# Patient Record
Sex: MALE | Race: WHITE
[De-identification: names, ages, dates, MRNs, and addresses within clinical notes are randomized per-mention and may not be internally consistent; named-entity substitution may affect disease eponyms.]

---
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
## 2018-01-28 ENCOUNTER — HOSPITAL ENCOUNTER (EMERGENCY)
Dept: HOSPITAL 47 - EC | Age: 18
Discharge: HOME | End: 2018-01-28
Payer: COMMERCIAL

## 2018-01-28 VITALS — SYSTOLIC BLOOD PRESSURE: 134 MMHG | DIASTOLIC BLOOD PRESSURE: 74 MMHG

## 2018-01-28 VITALS — RESPIRATION RATE: 18 BRPM | TEMPERATURE: 98.7 F | HEART RATE: 98 BPM
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
## 2018-01-28 DIAGNOSIS — S90.511A: ICD-10-CM

## 2018-01-28 DIAGNOSIS — F17.200: ICD-10-CM

## 2018-01-28 DIAGNOSIS — R00.0: ICD-10-CM

## 2018-01-28 DIAGNOSIS — R40.2412: ICD-10-CM
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
## 2018-01-28 DIAGNOSIS — S80.211A: ICD-10-CM

## 2018-01-28 DIAGNOSIS — S80.212A: Primary | ICD-10-CM
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
## 2018-01-28 LAB
ALBUMIN SERPL-MCNC: 4.8 G/DL (ref 3.5–5)
ALP SERPL-CCNC: 113 U/L (ref 38–126)
ALT SERPL-CCNC: 40 U/L (ref 21–72)
AMYLASE SERPL-CCNC: 83 U/L (ref 30–110)
ANION GAP SERPL CALC-SCNC: 14 MMOL/L
APTT BLD: 22 SEC (ref 22–30)
AST SERPL-CCNC: 29 U/L (ref 17–59)
BASOPHILS # BLD AUTO: 0 K/UL (ref 0–0.2)
BASOPHILS NFR BLD AUTO: 0 %
BUN SERPL-SCNC: 11 MG/DL (ref 9–20)
CALCIUM SPEC-MCNC: 10.4 MG/DL (ref 8.4–10.2)
CHLORIDE SERPL-SCNC: 108 MMOL/L (ref 98–107)
CK SERPL-CCNC: 349 U/L (ref 55–170)
CO2 SERPL-SCNC: 26 MMOL/L (ref 22–30)
EOSINOPHIL # BLD AUTO: 0 K/UL (ref 0–0.7)
EOSINOPHIL NFR BLD AUTO: 0 %
ERYTHROCYTE [DISTWIDTH] IN BLOOD BY AUTOMATED COUNT: 5.48 M/UL (ref 4.3–5.9)
ERYTHROCYTE [DISTWIDTH] IN BLOOD: 13 % (ref 11.5–15.5)
GLUCOSE BLD-MCNC: 97 MG/DL (ref 75–99)
GLUCOSE SERPL-MCNC: 99 MG/DL (ref 74–99)
HCT VFR BLD AUTO: 48.3 % (ref 39–53)
HGB BLD-MCNC: 16.2 GM/DL (ref 13–17.5)
INR PPP: 1.1 (ref ?–1.2)
LIPASE SERPL-CCNC: 38 U/L (ref 23–300)
LYMPHOCYTES # SPEC AUTO: 1.6 K/UL (ref 1–4.8)
LYMPHOCYTES NFR SPEC AUTO: 18 %
MCH RBC QN AUTO: 29.5 PG (ref 25–35)
MCHC RBC AUTO-ENTMCNC: 33.5 G/DL (ref 31–37)
MCV RBC AUTO: 88.1 FL (ref 80–100)
MONOCYTES # BLD AUTO: 0.5 K/UL (ref 0–1)
MONOCYTES NFR BLD AUTO: 6 %
NEUTROPHILS # BLD AUTO: 7 K/UL (ref 1.3–7.7)
NEUTROPHILS NFR BLD AUTO: 75 %
PLATELET # BLD AUTO: 206 K/UL (ref 150–450)
POTASSIUM SERPL-SCNC: 3.9 MMOL/L (ref 3.5–5.1)
PROT SERPL-MCNC: 7.7 G/DL (ref 6.3–8.2)
PT BLD: 10.8 SEC (ref 9–12)
SODIUM SERPL-SCNC: 148 MMOL/L (ref 137–145)
TROPONIN I SERPL-MCNC: <0.012 NG/ML (ref 0–0.03)
WBC # BLD AUTO: 9.3 K/UL (ref 4–11)

## 2018-01-28 PROCEDURE — 80053 COMPREHEN METABOLIC PANEL: CPT

## 2018-01-28 PROCEDURE — 85610 PROTHROMBIN TIME: CPT

## 2018-01-28 PROCEDURE — 85730 THROMBOPLASTIN TIME PARTIAL: CPT

## 2018-01-28 PROCEDURE — 71045 X-RAY EXAM CHEST 1 VIEW: CPT

## 2018-01-28 PROCEDURE — 86850 RBC ANTIBODY SCREEN: CPT

## 2018-01-28 PROCEDURE — 85025 COMPLETE CBC W/AUTO DIFF WBC: CPT

## 2018-01-28 PROCEDURE — 72170 X-RAY EXAM OF PELVIS: CPT

## 2018-01-28 PROCEDURE — 82553 CREATINE MB FRACTION: CPT

## 2018-01-28 PROCEDURE — 99284 EMERGENCY DEPT VISIT MOD MDM: CPT

## 2018-01-28 PROCEDURE — 36415 COLL VENOUS BLD VENIPUNCTURE: CPT

## 2018-01-28 PROCEDURE — 82550 ASSAY OF CK (CPK): CPT

## 2018-01-28 PROCEDURE — 86900 BLOOD TYPING SEROLOGIC ABO: CPT

## 2018-01-28 PROCEDURE — 93005 ELECTROCARDIOGRAM TRACING: CPT

## 2018-01-28 PROCEDURE — 80320 DRUG SCREEN QUANTALCOHOLS: CPT

## 2018-01-28 PROCEDURE — 84484 ASSAY OF TROPONIN QUANT: CPT

## 2018-01-28 PROCEDURE — 83690 ASSAY OF LIPASE: CPT

## 2018-01-28 PROCEDURE — 86901 BLOOD TYPING SEROLOGIC RH(D): CPT

## 2018-01-28 PROCEDURE — 82150 ASSAY OF AMYLASE: CPT
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
## 2018-03-31 ENCOUNTER — HOSPITAL ENCOUNTER (EMERGENCY)
Dept: HOSPITAL 47 - EC | Age: 18
Discharge: HOME | End: 2018-03-31
Payer: COMMERCIAL

## 2018-03-31 VITALS — RESPIRATION RATE: 18 BRPM

## 2018-03-31 VITALS — HEART RATE: 56 BPM | SYSTOLIC BLOOD PRESSURE: 114 MMHG | DIASTOLIC BLOOD PRESSURE: 61 MMHG | TEMPERATURE: 97 F

## 2018-03-31 DIAGNOSIS — F17.200: ICD-10-CM

## 2018-03-31 DIAGNOSIS — K52.9: Primary | ICD-10-CM

## 2018-03-31 LAB
ALBUMIN SERPL-MCNC: 4.3 G/DL (ref 3.5–5)
ALP SERPL-CCNC: 97 U/L (ref 38–126)
ALT SERPL-CCNC: 31 U/L (ref 21–72)
AMYLASE SERPL-CCNC: 73 U/L (ref 30–110)
ANION GAP SERPL CALC-SCNC: 15 MMOL/L
AST SERPL-CCNC: 22 U/L (ref 17–59)
BASOPHILS # BLD AUTO: 0 K/UL (ref 0–0.2)
BASOPHILS NFR BLD AUTO: 0 %
BUN SERPL-SCNC: 16 MG/DL (ref 9–20)
CALCIUM SPEC-MCNC: 9.6 MG/DL (ref 8.4–10.2)
CHLORIDE SERPL-SCNC: 107 MMOL/L (ref 98–107)
CO2 SERPL-SCNC: 26 MMOL/L (ref 22–30)
EOSINOPHIL # BLD AUTO: 0.2 K/UL (ref 0–0.7)
EOSINOPHIL NFR BLD AUTO: 2 %
ERYTHROCYTE [DISTWIDTH] IN BLOOD BY AUTOMATED COUNT: 5.5 M/UL (ref 4.3–5.9)
ERYTHROCYTE [DISTWIDTH] IN BLOOD: 13.2 % (ref 11.5–15.5)
GLUCOSE SERPL-MCNC: 90 MG/DL (ref 74–99)
HCT VFR BLD AUTO: 46.8 % (ref 39–53)
HGB BLD-MCNC: 15.3 GM/DL (ref 13–17.5)
LIPASE SERPL-CCNC: 36 U/L (ref 23–300)
LYMPHOCYTES # SPEC AUTO: 1.7 K/UL (ref 1–4.8)
LYMPHOCYTES NFR SPEC AUTO: 19 %
MCH RBC QN AUTO: 27.8 PG (ref 25–35)
MCHC RBC AUTO-ENTMCNC: 32.7 G/DL (ref 31–37)
MCV RBC AUTO: 85.1 FL (ref 80–100)
MONOCYTES # BLD AUTO: 0.6 K/UL (ref 0–1)
MONOCYTES NFR BLD AUTO: 7 %
NEUTROPHILS # BLD AUTO: 6.5 K/UL (ref 1.3–7.7)
NEUTROPHILS NFR BLD AUTO: 71 %
PH UR: 5.5 [PH] (ref 5–8)
PLATELET # BLD AUTO: 181 K/UL (ref 150–450)
POTASSIUM SERPL-SCNC: 3.9 MMOL/L (ref 3.5–5.1)
PROT SERPL-MCNC: 7.1 G/DL (ref 6.3–8.2)
RBC UR QL: 2 /HPF (ref 0–5)
SODIUM SERPL-SCNC: 148 MMOL/L (ref 137–145)
SP GR UR: 1.02 (ref 1–1.03)
UROBILINOGEN UR QL STRIP: <2 MG/DL (ref ?–2)
WBC # BLD AUTO: 9.1 K/UL (ref 4–11)
WBC #/AREA URNS HPF: 27 /HPF (ref 0–5)

## 2018-03-31 PROCEDURE — 96361 HYDRATE IV INFUSION ADD-ON: CPT

## 2018-03-31 PROCEDURE — 74022 RADEX COMPL AQT ABD SERIES: CPT

## 2018-03-31 PROCEDURE — 82150 ASSAY OF AMYLASE: CPT

## 2018-03-31 PROCEDURE — 36415 COLL VENOUS BLD VENIPUNCTURE: CPT

## 2018-03-31 PROCEDURE — 96374 THER/PROPH/DIAG INJ IV PUSH: CPT

## 2018-03-31 PROCEDURE — 85025 COMPLETE CBC W/AUTO DIFF WBC: CPT

## 2018-03-31 PROCEDURE — 83690 ASSAY OF LIPASE: CPT

## 2018-03-31 PROCEDURE — 99284 EMERGENCY DEPT VISIT MOD MDM: CPT

## 2018-03-31 PROCEDURE — 96376 TX/PRO/DX INJ SAME DRUG ADON: CPT

## 2018-03-31 PROCEDURE — 74177 CT ABD & PELVIS W/CONTRAST: CPT

## 2018-03-31 PROCEDURE — 80053 COMPREHEN METABOLIC PANEL: CPT

## 2018-03-31 PROCEDURE — 86140 C-REACTIVE PROTEIN: CPT

## 2018-03-31 PROCEDURE — 81001 URINALYSIS AUTO W/SCOPE: CPT

## 2018-03-31 PROCEDURE — 96375 TX/PRO/DX INJ NEW DRUG ADDON: CPT
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
## 2018-03-31 NOTE — XR
EXAMINATION TYPE: XR abdomen acute w cxr , 4 VIEWS

 

DATE OF EXAM ORDERED: 3/31/2018

 

HISTORY: Pain.

 

COMPARISON: None.

 

FINDINGS:  The lungs are clear. Pleural space are clear. The heart is not enlarged.

 

Within the abdomen, the abdominal gas pattern is normal. There is no evidence of obstruction or free 
air. There is a phlebolith in the right hemipelvis.

 

The right femoral head is nonspherical.

 

IMPRESSION: 

1. NO ACUTE THORACIC OR ABDOMINAL ABNORMALITY.

2. PLEASE CORRELATE CLINICALLY FOR FEMOROACETABULAR IMPINGEMENT SYNDROME OF THE RIGHT HIP.

## 2018-03-31 NOTE — CT
EXAMINATION TYPE: CT abdomen pelvis w con

 

DATE OF EXAM: 3/31/2018

 

REFERENCE: Previous study dated 4/14/2014

 

HISTORY: Pain

HISTORY: Abd pain

 

REFERENCE: NONE

 

CT DLP: 418.3 mGy

Automated exposure control for dose reduction was used.

 

TECHNIQUE: Helical acquisition through the abdomen and pelvis was obtained following the oral ingesti
on of without Oral Contrast and following intravenous administration of 100 mL of Isovue 300. The anh
a was reformatted in axial, coronal and sagittal projections.

 

FINDINGS:  Visualized portions of the lungs are clear. There is no pleural or pericardial fluid. The 
heart is not enlarged.

 

Within the abdomen, the liver is mildly prominent measuring 19 cm. The spleen and gallbladder are nor
mal.

 

Both adrenal glands are normal.

 

Both kidneys demonstrate function and appear morphologically normal.

 

Limited views of the pancreas appear normal.

 

There is no significant retroperitoneal, iliac or inguinal adenopathy.

 

The bladder is not distended.

 

Limited views of the appendix appear normal.

 

There are some mildly prominent loops of small bowel in the right side of the pelvis. The wall appear
s slightly thickened. The remainder the small bowel is of normal caliber with various loops do appear
 thickened.

 

There is a scant amount of free fluid within the pelvis. No free air is seen.

 

IMPRESSION: 

1. THICKENED LOOPS OF SMALL BOWEL. PLEASE CORRELATE ENTERITIS.

2. HEPATOMEGALY.

## 2019-03-07 ENCOUNTER — HOSPITAL ENCOUNTER (EMERGENCY)
Dept: HOSPITAL 47 - EC | Age: 19
LOS: 1 days | Discharge: HOME | End: 2019-03-08
Payer: COMMERCIAL

## 2019-03-07 DIAGNOSIS — F17.200: ICD-10-CM

## 2019-03-07 DIAGNOSIS — R11.2: ICD-10-CM

## 2019-03-07 DIAGNOSIS — R10.13: Primary | ICD-10-CM

## 2019-03-07 PROCEDURE — 36415 COLL VENOUS BLD VENIPUNCTURE: CPT

## 2019-03-07 PROCEDURE — 96375 TX/PRO/DX INJ NEW DRUG ADDON: CPT

## 2019-03-07 PROCEDURE — 99284 EMERGENCY DEPT VISIT MOD MDM: CPT

## 2019-03-07 PROCEDURE — 82150 ASSAY OF AMYLASE: CPT

## 2019-03-07 PROCEDURE — 80053 COMPREHEN METABOLIC PANEL: CPT

## 2019-03-07 PROCEDURE — 83690 ASSAY OF LIPASE: CPT

## 2019-03-07 PROCEDURE — 74018 RADEX ABDOMEN 1 VIEW: CPT

## 2019-03-07 PROCEDURE — 96374 THER/PROPH/DIAG INJ IV PUSH: CPT

## 2019-03-07 PROCEDURE — 81001 URINALYSIS AUTO W/SCOPE: CPT

## 2019-03-07 PROCEDURE — 83605 ASSAY OF LACTIC ACID: CPT

## 2019-03-07 PROCEDURE — 96361 HYDRATE IV INFUSION ADD-ON: CPT

## 2019-03-07 PROCEDURE — 85025 COMPLETE CBC W/AUTO DIFF WBC: CPT

## 2019-03-08 VITALS
DIASTOLIC BLOOD PRESSURE: 59 MMHG | SYSTOLIC BLOOD PRESSURE: 104 MMHG | HEART RATE: 87 BPM | TEMPERATURE: 97.5 F | RESPIRATION RATE: 16 BRPM

## 2019-03-08 LAB
ALBUMIN SERPL-MCNC: 3.3 G/DL (ref 3.5–5)
ALP SERPL-CCNC: 49 U/L (ref 38–126)
ALT SERPL-CCNC: 30 U/L (ref 21–72)
AMYLASE SERPL-CCNC: 49 U/L (ref 30–110)
ANION GAP SERPL CALC-SCNC: 4 MMOL/L
AST SERPL-CCNC: 21 U/L (ref 17–59)
BASOPHILS # BLD AUTO: 0 K/UL (ref 0–0.2)
BASOPHILS NFR BLD AUTO: 0 %
BUN SERPL-SCNC: 17 MG/DL (ref 9–20)
CALCIUM SPEC-MCNC: 8.9 MG/DL (ref 8.4–10.2)
CHLORIDE SERPL-SCNC: 114 MMOL/L (ref 98–107)
CO2 SERPL-SCNC: 26 MMOL/L (ref 22–30)
EOSINOPHIL # BLD AUTO: 0.2 K/UL (ref 0–0.7)
EOSINOPHIL NFR BLD AUTO: 3 %
ERYTHROCYTE [DISTWIDTH] IN BLOOD BY AUTOMATED COUNT: 4.9 M/UL (ref 4.3–5.9)
ERYTHROCYTE [DISTWIDTH] IN BLOOD: 14.1 % (ref 11.5–15.5)
GLUCOSE SERPL-MCNC: 104 MG/DL (ref 74–99)
HCT VFR BLD AUTO: 43.8 % (ref 39–53)
HGB BLD-MCNC: 14.1 GM/DL (ref 13–17.5)
LIPASE SERPL-CCNC: 59 U/L (ref 23–300)
LYMPHOCYTES # SPEC AUTO: 1.8 K/UL (ref 1–4.8)
LYMPHOCYTES NFR SPEC AUTO: 28 %
MCH RBC QN AUTO: 28.7 PG (ref 25–35)
MCHC RBC AUTO-ENTMCNC: 32.1 G/DL (ref 31–37)
MCV RBC AUTO: 89.4 FL (ref 80–100)
MONOCYTES # BLD AUTO: 0.4 K/UL (ref 0–1)
MONOCYTES NFR BLD AUTO: 7 %
NEUTROPHILS # BLD AUTO: 3.8 K/UL (ref 1.3–7.7)
NEUTROPHILS NFR BLD AUTO: 60 %
PH UR: 6.5 [PH] (ref 5–8)
PLATELET # BLD AUTO: 163 K/UL (ref 150–450)
POTASSIUM SERPL-SCNC: 3.8 MMOL/L (ref 3.5–5.1)
PROT SERPL-MCNC: 5.2 G/DL (ref 6.3–8.2)
SODIUM SERPL-SCNC: 144 MMOL/L (ref 137–145)
SP GR UR: 1.01 (ref 1–1.03)
SQUAMOUS UR QL AUTO: <1 /HPF (ref 0–4)
UROBILINOGEN UR QL STRIP: <2 MG/DL (ref ?–2)
WBC # BLD AUTO: 6.4 K/UL (ref 4–11)

## 2019-03-08 NOTE — ED
Abdominal Pain HPI





- General


Chief Complaint: Abdominal Pain


Stated Complaint: Abd Pain


Time Seen by Provider: 03/08/19 00:06


Source: patient


Limitations: no limitations





- History of Present Illness


Initial Comments: 


18-year-old male patient presents to the emergency department today for 

evaluation of upper abdominal pain radiating through to his back.  Patient 

states the pain started approximately one to 2 weeks ago.  States that the pain 

worsens with eating and at times he is unable to eat.  States he has been 

nauseated and has had a couple episodes of vomiting over the last couple of 

weeks.  Patient denies any radiation of the pain into his chest.  Denies any 

shortness of breath with this.  Denies any constipation, diarrhea, hematuria, 

dysuria, urinary urgency, urinary frequency.  Denies any history of abdominal 

surgery.  Denies any use of medications.  Denies any street drug or alcohol use.

Patient denies any recent rash, shortness breath, numbness, tingling, dizziness,

weakness, headache, visual changes, or any other complaints.








- Related Data


                                  Previous Rx's











 Medication  Instructions  Recorded


 


Famotidine [Pepcid] 20 mg PO DAILY #30 tablet 03/08/19











                                    Allergies











Allergy/AdvReac Type Severity Reaction Status Date / Time


 


No Known Allergies Allergy   Verified 03/08/19 00:03














Review of Systems


ROS Statement: 


Those systems with pertinent positive or pertinent negative responses have been 

documented in the HPI.





ROS Other: All systems not noted in ROS Statement are negative.





Past Medical History


Past Medical History: No Reported History


History of Any Multi-Drug Resistant Organisms: None Reported


Past Surgical History: No Surgical Hx Reported


Past Psychological History: No Psychological Hx Reported


Smoking Status: Current every day smoker


Past Alcohol Use History: None Reported


Past Drug Use History: None Reported





General Exam


Limitations: no limitations


General appearance: alert, in no apparent distress, other (Physical well-

developed, well-nourished adult male patient in no acute distress.  Vital signs 

upon presentation are temperature 97.5F, pulse 87, respirations 16, blood 

pressure 104/59, pulse ox 98% on room air.)


Eye exam: Present: normal appearance, PERRL, EOMI.  Absent: scleral icterus, 

conjunctival injection, periorbital swelling


ENT exam: Present: normal exam, normal oropharynx, mucous membranes moist


Respiratory exam: Present: normal lung sounds bilaterally.  Absent: respiratory 

distress, wheezes, rales, rhonchi, stridor


Cardiovascular Exam: Present: regular rate, normal rhythm, normal heart sounds. 

 Absent: systolic murmur, diastolic murmur, rubs, gallop, clicks


GI/Abdominal exam: Present: soft, tenderness (Midepigastric tenderness), normal 

bowel sounds.  Absent: distended, guarding, rebound, rigid


Neurological exam: Present: alert, oriented X3, CN II-XII intact


Psychiatric exam: Present: normal affect, normal mood


Skin exam: Present: warm, dry, intact, normal color.  Absent: rash





Course


                                   Vital Signs











  03/08/19





  00:00


 


Temperature 97.5 F L


 


Pulse Rate 87


 


Respiratory 16





Rate 


 


Blood Pressure 104/59


 


O2 Sat by Pulse 98





Oximetry 














Medical Decision Making





- Medical Decision Making


18-year-old male patient presented to the emergency department today for 

evaluation of upper abdominal pain and nausea 2 weeks.  Physical examination 

was relatively unremarkable.  Some mild midepigastric tenderness.  Labs reviewed

 and were unremarkable.  Patient did receive Toradol and Pepcid here in the 

emergency department.  Upon reevaluation is resting quite comfortably in bed and

 had difficulty waking him.  Once awake he states he is feeling better.  We did 

discuss possibility of gastritis versus, or dysfunction.  He'll be discharged 

home to follow-up with his primary care physician to have outpatient ultrasound 

performed.  He'll be given a prescription for Pepcid.  Return parameters were 

discussed in detail.  He verbalizes understanding and agrees with this plan.








- Lab Data


Result diagrams: 


                                 03/08/19 00:30





                                 03/08/19 00:30


                                   Lab Results











  03/08/19 03/08/19 03/08/19 Range/Units





  00:30 00:30 00:30 


 


WBC   6.4   (4.0-11.0)  k/uL


 


RBC   4.90   (4.30-5.90)  m/uL


 


Hgb   14.1   (13.0-17.5)  gm/dL


 


Hct   43.8   (39.0-53.0)  %


 


MCV   89.4   (80.0-100.0)  fL


 


MCH   28.7   (25.0-35.0)  pg


 


MCHC   32.1   (31.0-37.0)  g/dL


 


RDW   14.1   (11.5-15.5)  %


 


Plt Count   163   (150-450)  k/uL


 


Neutrophils %   60   %


 


Lymphocytes %   28   %


 


Monocytes %   7   %


 


Eosinophils %   3   %


 


Basophils %   0   %


 


Neutrophils #   3.8   (1.3-7.7)  k/uL


 


Lymphocytes #   1.8   (1.0-4.8)  k/uL


 


Monocytes #   0.4   (0-1.0)  k/uL


 


Eosinophils #   0.2   (0-0.7)  k/uL


 


Basophils #   0.0   (0-0.2)  k/uL


 


Sodium  144    (137-145)  mmol/L


 


Potassium  3.8    (3.5-5.1)  mmol/L


 


Chloride  114 H    ()  mmol/L


 


Carbon Dioxide  26    (22-30)  mmol/L


 


Anion Gap  4    mmol/L


 


BUN  17    (9-20)  mg/dL


 


Creatinine  0.88    (0.66-1.25)  mg/dL


 


Est GFR (CKD-EPI)AfAm  >90    (>60 ml/min/1.73 sqM)  


 


Est GFR (CKD-EPI)NonAf  >90    (>60 ml/min/1.73 sqM)  


 


Glucose  104 H    (74-99)  mg/dL


 


Plasma Lactic Acid Logan    0.9  (0.7-2.0)  mmol/L


 


Calcium  8.9    (8.4-10.2)  mg/dL


 


Total Bilirubin  0.3    (0.2-1.3)  mg/dL


 


AST  21    (17-59)  U/L


 


ALT  30    (21-72)  U/L


 


Alkaline Phosphatase  49    ()  U/L


 


Total Protein  5.2 L    (6.3-8.2)  g/dL


 


Albumin  3.3 L    (3.5-5.0)  g/dL


 


Amylase  49    ()  U/L


 


Lipase  59    ()  U/L


 


Urine Color     


 


Urine Appearance     (Clear)  


 


Urine pH     (5.0-8.0)  


 


Ur Specific Gravity     (1.001-1.035)  


 


Urine Protein     (Negative)  


 


Urine Glucose (UA)     (Negative)  


 


Urine Ketones     (Negative)  


 


Urine Blood     (Negative)  


 


Urine Nitrite     (Negative)  


 


Urine Bilirubin     (Negative)  


 


Urine Urobilinogen     (<2.0)  mg/dL


 


Ur Leukocyte Esterase     (Negative)  


 


Ur Squamous Epith Cells     (0-4)  /hpf


 


Amorphous Sediment     (None)  /hpf


 


Urine Mucus     (None)  /hpf














  03/08/19 Range/Units





  00:40 


 


WBC   (4.0-11.0)  k/uL


 


RBC   (4.30-5.90)  m/uL


 


Hgb   (13.0-17.5)  gm/dL


 


Hct   (39.0-53.0)  %


 


MCV   (80.0-100.0)  fL


 


MCH   (25.0-35.0)  pg


 


MCHC   (31.0-37.0)  g/dL


 


RDW   (11.5-15.5)  %


 


Plt Count   (150-450)  k/uL


 


Neutrophils %   %


 


Lymphocytes %   %


 


Monocytes %   %


 


Eosinophils %   %


 


Basophils %   %


 


Neutrophils #   (1.3-7.7)  k/uL


 


Lymphocytes #   (1.0-4.8)  k/uL


 


Monocytes #   (0-1.0)  k/uL


 


Eosinophils #   (0-0.7)  k/uL


 


Basophils #   (0-0.2)  k/uL


 


Sodium   (137-145)  mmol/L


 


Potassium   (3.5-5.1)  mmol/L


 


Chloride   ()  mmol/L


 


Carbon Dioxide   (22-30)  mmol/L


 


Anion Gap   mmol/L


 


BUN   (9-20)  mg/dL


 


Creatinine   (0.66-1.25)  mg/dL


 


Est GFR (CKD-EPI)AfAm   (>60 ml/min/1.73 sqM)  


 


Est GFR (CKD-EPI)NonAf   (>60 ml/min/1.73 sqM)  


 


Glucose   (74-99)  mg/dL


 


Plasma Lactic Acid Logan   (0.7-2.0)  mmol/L


 


Calcium   (8.4-10.2)  mg/dL


 


Total Bilirubin   (0.2-1.3)  mg/dL


 


AST   (17-59)  U/L


 


ALT   (21-72)  U/L


 


Alkaline Phosphatase   ()  U/L


 


Total Protein   (6.3-8.2)  g/dL


 


Albumin   (3.5-5.0)  g/dL


 


Amylase   ()  U/L


 


Lipase   ()  U/L


 


Urine Color  Yellow  


 


Urine Appearance  Cloudy  (Clear)  


 


Urine pH  6.5  (5.0-8.0)  


 


Ur Specific Gravity  1.014  (1.001-1.035)  


 


Urine Protein  Negative  (Negative)  


 


Urine Glucose (UA)  Negative  (Negative)  


 


Urine Ketones  Negative  (Negative)  


 


Urine Blood  Negative  (Negative)  


 


Urine Nitrite  Negative  (Negative)  


 


Urine Bilirubin  Negative  (Negative)  


 


Urine Urobilinogen  <2.0  (<2.0)  mg/dL


 


Ur Leukocyte Esterase  Negative  (Negative)  


 


Ur Squamous Epith Cells  <1  (0-4)  /hpf


 


Amorphous Sediment  Few H  (None)  /hpf


 


Urine Mucus  Rare H  (None)  /hpf














- Radiology Data


Radiology results: report reviewed, image reviewed


Two-view x-ray of the abdomen is obtained.  Report was reviewed in its entirety.

  Impression by Dr. Campos shows bowel gas pattern is nonobstructive.  No free air

 is identified.  Phleboliths in the right pelvis again noted.  





Disposition


Clinical Impression: 


 Abdominal pain





Disposition: HOME SELF-CARE


Condition: Good


Instructions (If sedation given, give patient instructions):  Diet for Stomach 

Ulcers and Gastritis (ED), Abdominal Pain (ED)


Additional Instructions: 


Take medications as directed.  Follow-up through primary care physician for 

ultrasound to rule out gallbladder dysfunction.  Return to the emergency 

department immediately for any new, worsening, or concerning symptoms


Prescriptions: 


Famotidine [Pepcid] 20 mg PO DAILY #30 tablet


Is patient prescribed a controlled substance at d/c from ED?: No


Referrals: 


Terrell Honeycutt DO [Primary Care Provider] - 1-2 days


Time of Disposition: 02:29

## 2019-03-08 NOTE — XR
EXAM:

  XR Kub

 

CLINICAL HISTORY:

  ITS.REASON XR Reason: abdominal pain

 

TECHNIQUE:

  X-ray kub.

 

COMPARISON:

3/31/18

 

FINDINGS/IMPRESSION:

2 upright views of the abdomen/pelvis.

 

IMPRESSION:     

Bowel gas pattern is nonobstructive.  No free air is identified.

Phlebolith in the right pelvis again noted.

Of note, findings of enteritis seen on the previous CT would likely be 

occult by plain film, if present.

## 2020-11-13 ENCOUNTER — HOSPITAL ENCOUNTER (INPATIENT)
Dept: HOSPITAL 47 - EC | Age: 20
LOS: 4 days | Discharge: HOME | DRG: 885 | End: 2020-11-17
Attending: PSYCHIATRY & NEUROLOGY | Admitting: PSYCHIATRY & NEUROLOGY
Payer: COMMERCIAL

## 2020-11-13 VITALS — BODY MASS INDEX: 23.8 KG/M2

## 2020-11-13 DIAGNOSIS — F12.90: ICD-10-CM

## 2020-11-13 DIAGNOSIS — F41.9: ICD-10-CM

## 2020-11-13 DIAGNOSIS — F15.10: ICD-10-CM

## 2020-11-13 DIAGNOSIS — Z20.828: ICD-10-CM

## 2020-11-13 DIAGNOSIS — R45.87: ICD-10-CM

## 2020-11-13 DIAGNOSIS — Z59.0: ICD-10-CM

## 2020-11-13 DIAGNOSIS — F25.0: Primary | ICD-10-CM

## 2020-11-13 DIAGNOSIS — F17.210: ICD-10-CM

## 2020-11-13 DIAGNOSIS — Z65.3: ICD-10-CM

## 2020-11-13 DIAGNOSIS — R45.851: ICD-10-CM

## 2020-11-13 DIAGNOSIS — Z62.810: ICD-10-CM

## 2020-11-13 DIAGNOSIS — Z71.6: ICD-10-CM

## 2020-11-13 PROCEDURE — 80053 COMPREHEN METABOLIC PANEL: CPT

## 2020-11-13 PROCEDURE — 83036 HEMOGLOBIN GLYCOSYLATED A1C: CPT

## 2020-11-13 PROCEDURE — 85025 COMPLETE CBC W/AUTO DIFF WBC: CPT

## 2020-11-13 PROCEDURE — 82075 ASSAY OF BREATH ETHANOL: CPT

## 2020-11-13 PROCEDURE — 84443 ASSAY THYROID STIM HORMONE: CPT

## 2020-11-13 PROCEDURE — 99285 EMERGENCY DEPT VISIT HI MDM: CPT

## 2020-11-13 RX ADMIN — NICOTINE SCH: 21 PATCH, EXTENDED RELEASE TRANSDERMAL at 22:22

## 2020-11-13 SDOH — ECONOMIC STABILITY - HOUSING INSECURITY: HOMELESSNESS: Z59.0

## 2020-11-13 NOTE — ED
General Adult HPI





- General


Source: patient, RN notes reviewed, old records reviewed


Mode of arrival: ambulatory


Limitations: no limitations





<Ulysses Santos - Last Filed: 11/13/20 14:30>





<Efren Ibrahim - Last Filed: 11/13/20 16:21>





- General


Chief complaint: Psychiatric Symptoms


Stated complaint: Mental health


Time Seen by Provider: 11/13/20 13:00





- History of Present Illness


Initial comments: 





This is a 19-year-old male who presents emergency department stating that he 

suicidal.  Patient states he has attempted multiple times in the past and he was

thinking about taking his pills so he decided come in and get help.  Patient 

denies taking any pills.  Patient denies any illegal drug use today but he stat

ed he did methamphetamine 3 days ago.  Patient denies any alcohol today.  

Patient denies any physical complaints today.  Patient denies headache patient 

denies numbness weakness.  Patient denies any lightheadedness dizziness or near 

syncopal episode.  Patient denies chest pain palpitations difficulty breathing 

shortness breath.  Patient denies any recent fever chills or cough per patient 

denies abdominal pain patient denies nausea vomiting or diarrhea 

(Ulysses Santos)





- Related Data


                                Home Medications











 Medication  Instructions  Recorded  Confirmed


 


No Known Home Medications  11/13/20 11/13/20











                                    Allergies











Allergy/AdvReac Type Severity Reaction Status Date / Time


 


No Known Allergies Allergy   Verified 11/13/20 13:52














Review of Systems


ROS Other: All systems not noted in ROS Statement are negative.





<Ulysses Santos - Last Filed: 11/13/20 14:30>


ROS Other: All systems not noted in ROS Statement are negative.





<Efren Ibrahim - Last Filed: 11/13/20 16:21>


ROS Statement: 


Those systems with pertinent positive or pertinent negative responses have been 

documented in the HPI.








Past Medical History


Past Medical History: No Reported History


History of Any Multi-Drug Resistant Organisms: None Reported


Past Surgical History: No Surgical Hx Reported


Past Psychological History: No Psychological Hx Reported


Smoking Status: Current every day smoker


Past Alcohol Use History: None Reported


Past Drug Use History: Cocaine, Heroin, Marijuana, Methamphetamine





<Ulysses Santos - Last Filed: 11/13/20 14:30>





General Exam


Limitations: no limitations





<Ulysses Santos - Last Filed: 11/13/20 14:30>





- General Exam Comments


Initial Comments: 





GENERAL:


Patient is well-developed and well-nourished.  Patient is nontoxic and well-

hydrated and is in no acute distress.





ENT:


Neck is soft and supple.  No significant lymphadenopathy is noted.  Oropharynx 

is clear.  Moist mucous membranes.  Neck has full range of motion without 

eliciting any pain. 





EYES:


The sclera were anicteric and conjunctiva were pink and moist.  Extraocular 

movements were intact and pupils were equal round and reactive to light.  

Eyelids were unremarkable.





PULMONARY:


Unlabored respirations.  Good breath sounds bilaterally.  No audible rales 

rhonchi or wheezing was noted.





CARDIOVASCULAR:


There is a regular rate and rhythm without any murmurs gallops or rubs.  





ABDOMEN:


Soft and nontender with normal bowel sounds. 





SKIN:


Skin is clear with no lesions or rashes and otherwise unremarkable.





NEUROLOGIC:


Patient is alert and oriented x3.  Cranial nerves II through XII are grossly 

intact.  Motor and sensory are also intact.  Normal speech, volume and content. 

Symmetrical smile. 





MUSCULOSKELETAL:


Normal extremities with adequate strength and full range of motion. 





LYMPHATICS:


No significant lymphadenopathy is noted





PSYCHIATRIC:


Patient states he suicidal and contemplating taking all of his pills. 

(Ulysses Santos)





Course





                                   Vital Signs











  11/13/20





  12:57


 


Temperature 97.1 F L


 


Pulse Rate 108 H


 


Respiratory 16





Rate 


 


Blood Pressure 125/65


 


O2 Sat by Pulse 100





Oximetry 














Medical Decision Making





<Ulysses Santos - Last Filed: 11/13/20 14:30>





<Efern Ibrahim - Last Filed: 11/13/20 16:21>





- Medical Decision Making





Dr. Ibrahim will be taking over the care of this patient at 3 PM  

(Ulysses Santos)


Patient was sent out to me by previous shift physician, Dr. Santos.  Briefly, 

patient is a 19-year-old male presents to the emergency Department for suicidal 

ideation.  Plan was to follow-up with EPS recommendations.  Patient was 

evaluated by EPS and will admit patient to inpatient psychiatry. (Efren Ibrahim)





Disposition





<Ulysses Santos - Last Filed: 11/13/20 14:30>


Decision Time: 16:21





<Efren Ibrahim - Last Filed: 11/13/20 16:21>


Clinical Impression: 


 Suicidal ideation





Disposition: ADMITTED AS IP TO THIS HOSP


Condition: Fair


Referrals: 


None,Stated [Primary Care Provider] - 1-2 days

## 2020-11-14 LAB
ALBUMIN SERPL-MCNC: 4.5 G/DL (ref 3.5–5)
ALP SERPL-CCNC: 87 U/L (ref 38–126)
ALT SERPL-CCNC: 17 U/L (ref 4–49)
ANION GAP SERPL CALC-SCNC: 7 MMOL/L
AST SERPL-CCNC: 23 U/L (ref 17–59)
BASOPHILS # BLD AUTO: 0 K/UL (ref 0–0.2)
BASOPHILS NFR BLD AUTO: 0 %
BUN SERPL-SCNC: 13 MG/DL (ref 9–20)
CALCIUM SPEC-MCNC: 10.1 MG/DL (ref 8.4–10.2)
CHLORIDE SERPL-SCNC: 107 MMOL/L (ref 98–107)
CO2 SERPL-SCNC: 29 MMOL/L (ref 22–30)
EOSINOPHIL # BLD AUTO: 0.1 K/UL (ref 0–0.7)
EOSINOPHIL NFR BLD AUTO: 2 %
ERYTHROCYTE [DISTWIDTH] IN BLOOD BY AUTOMATED COUNT: 6.1 M/UL (ref 4.3–5.9)
ERYTHROCYTE [DISTWIDTH] IN BLOOD: 13.8 % (ref 11.5–15.5)
GLUCOSE SERPL-MCNC: 117 MG/DL (ref 74–99)
HBA1C MFR BLD: 5.7 % (ref 4–6)
HCT VFR BLD AUTO: 52.9 % (ref 39–53)
HGB BLD-MCNC: 16.8 GM/DL (ref 13–17.5)
LYMPHOCYTES # SPEC AUTO: 1.7 K/UL (ref 1–4.8)
LYMPHOCYTES NFR SPEC AUTO: 31 %
MCH RBC QN AUTO: 27.5 PG (ref 25–35)
MCHC RBC AUTO-ENTMCNC: 31.7 G/DL (ref 31–37)
MCV RBC AUTO: 86.7 FL (ref 80–100)
MONOCYTES # BLD AUTO: 0.3 K/UL (ref 0–1)
MONOCYTES NFR BLD AUTO: 6 %
NEUTROPHILS # BLD AUTO: 3.3 K/UL (ref 1.3–7.7)
NEUTROPHILS NFR BLD AUTO: 59 %
PLATELET # BLD AUTO: 264 K/UL (ref 150–450)
POTASSIUM SERPL-SCNC: 4.4 MMOL/L (ref 3.5–5.1)
PROT SERPL-MCNC: 7.5 G/DL (ref 6.3–8.2)
SODIUM SERPL-SCNC: 143 MMOL/L (ref 137–145)
WBC # BLD AUTO: 5.6 K/UL (ref 3.8–10.6)

## 2020-11-14 RX ADMIN — ACETAMINOPHEN PRN MG: 325 TABLET, FILM COATED ORAL at 08:36

## 2020-11-14 RX ADMIN — DIVALPROEX SODIUM SCH MG: 500 TABLET, FILM COATED, EXTENDED RELEASE ORAL at 21:25

## 2020-11-14 RX ADMIN — NICOTINE SCH PATCH: 21 PATCH, EXTENDED RELEASE TRANSDERMAL at 08:35

## 2020-11-14 RX ADMIN — ACETAMINOPHEN PRN MG: 325 TABLET, FILM COATED ORAL at 17:54
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
## 2020-11-15 RX ADMIN — NICOTINE SCH PATCH: 21 PATCH, EXTENDED RELEASE TRANSDERMAL at 08:24

## 2020-11-15 RX ADMIN — DIVALPROEX SODIUM SCH: 500 TABLET, FILM COATED, EXTENDED RELEASE ORAL at 21:33

## 2020-11-15 NOTE — P.PN
Progress Note - Text


Progress Note Date: 11/15/20





Interval History:


Patient was seen resting in bed and was directable and agreeable to speak with 

writer in his room with no one else present.  Patient filed a 72 hour notice for

discharge.  Patient reports that he feels like this inpatient psychiatric 

admission is not exactly what he expected.  He reports that he did not expect to

be checked on multiple times throughout the day.  This provider pointed him that

he is on suicide precautions due to the severity of his prior attempts at 

suicide.  Today he is not endorsing any suicidal or homicidal ideation, 

intention, and/or plan.  He is not reporting any auditory or visual 

hallucinations.  He is been adherent with his medications and denies any side 

effects at this time.  He reports that he was able to speak with his mother and 

grandfather and states that he are allowing him to return home upon discharge.





Mental Status Exam:


General Appearance: Patient appears to be stated age is alert, directable, and 

cooperative. 


Behavior: He is lying in bed covered with his bedsheets.  Eye contact is 

intermittent.


Speech: Patient's speech is fluent and nonpressured. 


Mood/Affect: Mood is improving mildly, affect is congruent and blunted.


Suicidality/Homicidality:  Patient denies having any suicidal or homicidal 

ideation intent or plan.  


Perceptions: Patient denies any visual hallucinations and denies any auditory 

hallucinations  


Though content/process: There is no evidence of any delusional thought content 

and thought process is linear and goal-directed.  Fixation on discharge.


Memory and concentration: AOX3, grossly intact for the purposes of this session


Judgment and insight: Poor, slightly improving





Assessment


Schizoaffective disorder, bipolar type


Polysubstance use disorder


Tobacco use disorder


Rule out post dramatic stress disorder


Rule out antisocial personality disorder





Plan:


-Patient continues to meet criteria for inpatient psychiatric admission for 

symptom stabilization and safety. Patient has signed adult voluntary form and 

medication consent and was placed in patient's chart.  Patient filed a 72 hour 

notice yesterday.


-Medications: 


Depakote  mg by mouth at bedtime for mood stabilization


Prozac 30 mg by mouth daily for depression/anxiety/PTSD


Increase Zyprexa to 10 mg by mouth at bedtime for mood stabilization/bipolar 

depression 


-When necessary Vistaril and Haldol for agitation/aggression.


-NRT - nicotine patch


-SW on board for discharge planning.  Encouraged the patient to participate in 

milieu.

## 2020-11-16 RX ADMIN — NICOTINE SCH PATCH: 21 PATCH, EXTENDED RELEASE TRANSDERMAL at 08:22

## 2020-11-16 RX ADMIN — DIVALPROEX SODIUM SCH: 500 TABLET, FILM COATED, EXTENDED RELEASE ORAL at 22:12

## 2020-11-16 NOTE — P.PN
Progress Note - Text


Progress Note Date: 11/16/20


Interval History:


Patient was seen resting in bed and was directable and agreeable to speak with 

writer in his room with no one else present.  Patient filed a 72 hour notice for

discharge on 11/14/2020 at 1824.  Currently the patient is not reporting any 

suicidal or homicidal ideation, intention, and/or plan.  Patient is requesting 

discharge as he feels like being in inpatient psychiatric unit and being locked 

up is increasing his anxiety.  He is otherwise reporting that his depression has

improved significantly.  He is future oriented.  He reports that he plans to 

return home and to follow up with his outpatient treatment so that he may be 

there for his fianc and children.  He refused to take Depakote last night as he

feels like the medication except him feel "funny and odd."  He is otherwise 

adherent with his Prozac and Zyprexa.  He reports no significant side effects of

those medications.  He denies any auditory or visual hallucinations.





Mental Status Exam:


General Appearance: Patient appears to be stated age is alert, directable, and 

cooperative. 


Behavior: Patient gets out of bed when directed.  Eye contact is fair.  Insight 

normal psychomotor activity.


Speech: Patient's speech is fluent and nonpressured.  Monotone.


Mood/Affect: Mood is improving mildly, affect is congruent and constricted in 

range.


Suicidality/Homicidality:  Patient denies having any suicidal or homicidal 

ideation intent or plan.  


Perceptions: Patient denies any visual hallucinations and denies any auditory 

hallucinations  


Though content/process: There is no evidence of any delusional thought content 

and thought process is linear and goal-directed. 


Memory and concentration: AOX3, grossly intact for the purposes of this session


Judgment and insight: Improving mildly.





Assessment


Schizoaffective disorder, bipolar type


Polysubstance use disorder


Tobacco use disorder


Rule out post dramatic stress disorder


Rule out antisocial personality disorder





Plan:


-Patient continues to meet criteria for inpatient psychiatric admission for 

symptom stabilization and safety. Patient has signed adult voluntary form and 

medication consent and was placed in patient's chart.  Patient filed a 72 hour 

notice on 11/14/2020.


-Medications: 


We will discontinue Depakote at this time as per patient preference.


Increase Prozac to 40 mg by mouth daily for depression/anxiety/PTSD


Continue Zyprexa 10 mg by mouth at bedtime for mood stabilization/bipolar 

depression 


-When necessary Vistaril and Haldol for agitation/aggression.


-NRT - nicotine patch


-SW on board for discharge planning.  Encouraged the patient to participate in 

milieu.

## 2020-11-16 NOTE — P.MDCNMH
History of Present Illness


H&P Date: 11/16/20


Chief Complaint: medical evaluation 





19 year old male with depression 





patient comes in due to depressioni and suicidal ideation , he admits to not 

taking his meds





patient denies any medical concerns , denies URI symptoms, GI bleeding , abd 

pain , nausea or vomiting, denies any diarrhea, or focal neuro deficits, denies 

SOB, or chest pain 





Review of Systems





 








Pertinent positives as noted in HPI. All other systems were reviewed and are 

negative


 





Past Medical History


Past Medical History: No Reported History


History of Any Multi-Drug Resistant Organisms: None Reported


Past Surgical History: No Surgical Hx Reported


Past Psychological History: No Psychological Hx Reported


Smoking Status: Current every day smoker


Past Alcohol Use History: None Reported


Past Drug Use History: Cocaine, Heroin, Marijuana, Methamphetamine





Medications and Allergies


                                Home Medications











 Medication  Instructions  Recorded  Confirmed  Type


 


No Known Home Medications  11/13/20 11/13/20 History








                                    Allergies











Allergy/AdvReac Type Severity Reaction Status Date / Time


 


No Known Allergies Allergy   Verified 11/13/20 13:52














Physical Exam


Vitals: 


                                   Vital Signs











  Temp


 


 11/16/20 18:23  98.3 F


 


 11/16/20 14:18  98.3 F


 


 11/15/20 21:32  98.2 F


 


 11/15/20 21:29  98.2 F














Constitutional:          No acute distress, conversant, pleasant


Eyes:      Anicteric sclerae, moist conjunctiva, no lid-lag


         Pupils equal round reactive to light





ENMT:      NC/AT


         Oropharynx clear, no erythema, or exudates





Neck:      Supple, FROM, no masses, or JVD


         No carotid bruits


         No thyromegaly





Lungs:      Clear to auscultation


         Clear to percussion


         Normal respiratory effort, no accessory muscle use 





Cardiovascular:      Heart regular in rate and rhythm, 


         No murmurs, gallops, or rubs


         No peripheral edema





Abdominal:       Soft


         Nontender, no guarding, rebound or rigidity


         Abdomen moving with respiration


         Normoactive bowel sounds


         No hepatomegaly, No splenomegaly


         No palpable mass 


         No abdominal wall hernia noted 





Skin:      Normal temperature, tone, texture, turgor


         No induration


         No subcutaneous nodules 


         No rash, lesions


         No ulcers





Extremities:      No digital cyanosis 


         No clubbing


         Pedal pulses intact and symmetrical


         Radial pulses intact and symmetrical 


         No calf tenderness 





Psychiatric:      Alert and oriented to person, place and time


         Appropriate affect


         fair judgement   


      


Neuro      Muscles Strength 5/5 in all 4 extremities 


         Sensation to light touch grossly present throughout


         Cranial nerves II-XII grossly intact


         No focal sensory deficits


Lymphatics:       no palpable cervical or supraclavicular , or inguinal lymph 

nodes  





Cranial Nerve Examination





- Cranial Nerves


Cranial Nerve II- Optic: Intact


Cranial Nerve III- Oculomotor: Intact


Cranial Nerve IV- Trochlear: Intact


Cranial Nerve V- Trigeminal: Intact


Cranial Nerve VI- Abducens: Intact


Cranial Nerve VII- Facial: Intact


Cranial Nerve VIII- Auditory: Intact


Cranial Nerve IX- Glossopharyngeal: Intact


Cranial Nerve X- Vagus: Intact


Cranial Nerve XI- Accessory: Intact


Cranial Nerve XII- Hypoglossal: Intact





Results


CBC & Chem 7: 


                                 11/14/20 08:54





                                 11/14/20 08:54





Assessment and Plan


Assessment: 





major depression , suicidal ideation 


management per psych 





tobacco smoking 


nicotine replacement therapy 





polysubstance abuse 


counseled to quit drug of abuse 





Thank you for allowing us to participate in the care of this patient.  We will 

follow peripherally.  Do not hesitate to contact us with questions.  Someone can

be reached from the [facility] hospitalist group at all hours of the day 

at [phone].

## 2020-11-17 VITALS
HEART RATE: 52 BPM | RESPIRATION RATE: 17 BRPM | TEMPERATURE: 97.7 F | DIASTOLIC BLOOD PRESSURE: 53 MMHG | SYSTOLIC BLOOD PRESSURE: 98 MMHG

## 2020-11-17 RX ADMIN — NICOTINE SCH PATCH: 21 PATCH, EXTENDED RELEASE TRANSDERMAL at 08:43
